# Patient Record
Sex: FEMALE | Race: WHITE | URBAN - METROPOLITAN AREA
[De-identification: names, ages, dates, MRNs, and addresses within clinical notes are randomized per-mention and may not be internally consistent; named-entity substitution may affect disease eponyms.]

---

## 2017-01-16 ENCOUNTER — OFFICE VISIT (OUTPATIENT)
Dept: URGENT CARE | Facility: URGENT CARE | Age: 62
End: 2017-01-16
Payer: COMMERCIAL

## 2017-01-16 VITALS
HEART RATE: 98 BPM | DIASTOLIC BLOOD PRESSURE: 70 MMHG | RESPIRATION RATE: 16 BRPM | WEIGHT: 170 LBS | OXYGEN SATURATION: 97 % | HEIGHT: 67 IN | SYSTOLIC BLOOD PRESSURE: 110 MMHG | BODY MASS INDEX: 26.68 KG/M2 | TEMPERATURE: 103 F

## 2017-01-16 DIAGNOSIS — J04.0 LARYNGITIS: ICD-10-CM

## 2017-01-16 DIAGNOSIS — H65.01 RIGHT ACUTE SEROUS OTITIS MEDIA, RECURRENCE NOT SPECIFIED: Primary | ICD-10-CM

## 2017-01-16 DIAGNOSIS — H69.91 DYSFUNCTION OF EUSTACHIAN TUBE, RIGHT: ICD-10-CM

## 2017-01-16 PROCEDURE — 99214 OFFICE O/P EST MOD 30 MIN: CPT | Performed by: FAMILY MEDICINE

## 2017-01-16 RX ORDER — PREDNISONE 20 MG/1
20 TABLET ORAL 2 TIMES DAILY
Qty: 10 TABLET | Refills: 0 | Status: SHIPPED | OUTPATIENT
Start: 2017-01-16

## 2017-01-16 RX ORDER — FLUTICASONE PROPIONATE 50 MCG
2 SPRAY, SUSPENSION (ML) NASAL DAILY
Qty: 1 BOTTLE | Refills: 0 | Status: SHIPPED | OUTPATIENT
Start: 2017-01-16

## 2017-01-16 NOTE — MR AVS SNAPSHOT
"              After Visit Summary   2017    Ana Agee    MRN: [id number]           Patient Information     Date Of Birth          1955        Visit Information        Provider Department      2017 6:15 PM Provider, Rafa Harris [facility]        Today's Diagnoses     Right acute serous otitis media, recurrence not specified    -  1     Laryngitis         Dysfunction of eustachian tube, right            Follow-ups after your visit        Who to contact     If you have questions or need follow up information about today's clinic visit or your schedule please contact [facility] directly at [phone].  Normal or non-critical lab and imaging results will be communicated to you by Qewzhart, letter or phone within 4 business days after the clinic has received the results. If you do not hear from us within 7 days, please contact the clinic through Qewzhart or phone. If you have a critical or abnormal lab result, we will notify you by phone as soon as possible.  Submit refill requests through SpinNote or call your pharmacy and they will forward the refill request to us. Please allow 3 business days for your refill to be completed.          Additional Information About Your Visit        MyChart Information     SpinNote lets you send messages to your doctor, view your test results, renew your prescriptions, schedule appointments and more. To sign up, go to [email or web address] . Click on \"Log in\" on the left side of the screen, which will take you to the Welcome page. Then click on \"Sign up Now\" on the right side of the page.     You will be asked to enter the access code listed below, as well as some personal information. Please follow the directions to create your username and password.     Your access code is: [email or web address]  Expires: 2017  7:53 PM     Your access code will  in 90 days. If you need help or a new code, please call your [place] clinic " "or [phone].        Care EveryWhere ID     This is your Care EveryWhere ID. This could be used by other organizations to access your [place] medical records  [id number]        Your Vitals Were     Pulse Temperature Respirations Height BMI (Body Mass Index) Pulse Oximetry    98 103  F (39.4  C) (Oral) 16 5' 7\" (1.702 m) 26.62 kg/m2 97%       Blood Pressure from Last 3 Encounters:   01/16/17 110/70    Weight from Last 3 Encounters:   01/16/17 170 lb (77.111 kg)              Today, you had the following     No orders found for display         Today's Medication Changes          These changes are accurate as of: 1/16/17  7:53 PM.  If you have any questions, ask your nurse or doctor.               Start taking these medicines.        Dose/Directions    amoxicillin-clavulanate 875-125 MG per tablet   Commonly known as:  AUGMENTIN   Used for:  Right acute serous otitis media, recurrence not specified   Started by:  [facility]        Dose:  1 tablet   Take 1 tablet by mouth 2 times daily for 10 days   Quantity:  20 tablet   Refills:  0       fluticasone 50 MCG/ACT spray   Commonly known as:  FLONASE   Used for:  Dysfunction of eustachian tube, right   Started by:  Rafa [facility]        Dose:  2 spray   Spray 2 sprays into both nostrils daily   Quantity:  1 Bottle   Refills:  0       predniSONE 20 MG tablet   Commonly known as:  DELTASONE   Used for:  Laryngitis   Started by:  Rafa [facility]        Dose:  20 mg   Take 1 tablet (20 mg) by mouth 2 times daily   Quantity:  10 tablet   Refills:  0            Where to get your medicines      These medications were sent to [facility]/pharmacy #[id number] - [place], [place] - [address] [address]  [address] [address] [address]    Hours:  [facility] [employer] Phone:  [phone]    - amoxicillin-clavulanate 875-125 MG per tablet  - fluticasone 50 MCG/ACT spray  - predniSONE 20 MG tablet             Primary Care Provider Office Phone # Fax #    Dorothy Pabon MD " [phone]       [facility] [place] [address]  [place] [address]        Thank you!     Thank you for choosing [facility]  for your care. Our goal is always to provide you with excellent care. Hearing back from our patients is one way we can continue to improve our services. Please take a few minutes to complete the written survey that you may receive in the mail after your visit with us. Thank you!             Your Updated Medication List - Protect others around you: Learn how to safely use, store and throw away your medicines at www.disposemymeds.org.          This list is accurate as of: 1/16/17  7:53 PM.  Always use your most recent med list.                   Brand Name Dispense Instructions for use    amoxicillin-clavulanate 875-125 MG per tablet    AUGMENTIN    20 tablet    Take 1 tablet by mouth 2 times daily for 10 days       ATENOLOL PO          fluticasone 50 MCG/ACT spray    FLONASE    1 Bottle    Spray 2 sprays into both nostrils daily       LEVOTHYROXINE SODIUM PO          predniSONE 20 MG tablet    DELTASONE    10 tablet    Take 1 tablet (20 mg) by mouth 2 times daily

## 2017-01-17 NOTE — PROGRESS NOTES
"SUBJECTIVE:   Ana Agee is a 61 year old female who complains of nasal congestion, nasal blockage, runny nose, yellow nasal discharge, headache, right ear pain, fullness and pressure, cough, low grade fevers, chills and hoarseness for 5 days. She denies a history of wheezing, shortness of breath, nausea, vomiting and chest pain and denies a history of asthma. Patient denies smoke cigarettes.  Has taken Tylenol, Mucinex-D but not helpful.     No past medical history on file.     No past surgical history on file.     Current Outpatient Prescriptions   Medication Sig Dispense Refill     ATENOLOL PO        LEVOTHYROXINE SODIUM PO              OBJECTIVE:  /70 mmHg  Pulse 98  Temp(Src) 103  F (39.4  C) (Oral)  Resp 16  Ht 5' 7\" (1.702 m)  Wt 170 lb (77.111 kg)  BMI 26.62 kg/m2  SpO2 97%   She appears in NAD but voice is hoarse,  vital signs are as noted by the nurse. Ears : right TM erythematous with middle ear effusion. Throat and pharynx: erythematous without exudates. Neck supple. No adenopathy in the neck. Nose is congested. Sinuses non tender. The chest is clear, without wheezes or rales. The abdomen is soft without tenderness, guarding, mass, rebound or organomegaly. Bowel sounds are normal.       Assessment/Plan:   (H65.01) Right acute serous otitis media, recurrence not specified  (primary encounter diagnosis)  Comment:   Plan: amoxicillin-clavulanate (AUGMENTIN) 875-125 MG         per tablet            (J04.0) Laryngitis  Comment:   Plan: predniSONE (DELTASONE) 20 MG tablet            (H69.81) Dysfunction of eustachian tube, right  Comment:   Plan: fluticasone (FLONASE) 50 MCG/ACT spray             Symptomatic therapy suggested: push fluids, rest, gargle warm salt water, use vaporizer or mist needed , use decongestant of choice as needed and apply heat to sinuses as needed. Call or return to clinic prn if these symptoms worsen or fail to improve as anticipated.   "